# Patient Record
Sex: FEMALE | Race: WHITE
[De-identification: names, ages, dates, MRNs, and addresses within clinical notes are randomized per-mention and may not be internally consistent; named-entity substitution may affect disease eponyms.]

---

## 2020-08-21 NOTE — OR
DATE OF OPERATION:  08/21/2020

 

PREOPERATIVE DIAGNOSIS:  SCREENING COLONOSCOPY.

 

POSTOPERATIVE DIAGNOSIS:  SCREENING COLONOSCOPY.

 

SURGEON:  Kenny Hoskins MD

 

PROCEDURE:  FULL-LENGTH COLONOSCOPY.

 

ANESTHESIA:  MAC.

 

COMPLICATIONS:  None.

 

SPECIMEN:  None.

 

FINDINGS:

1. Full-length colonoscopy.

2. Mild sigmoid diverticulosis.

 

RECOMMENDATIONS:  Followup colonoscopy in 10 years.

 

INDICATIONS:  The patient is due for a routine colon cancer screening and was

sent for such.

 

DESCRIPTION OF PROCEDURE:  The patient was prepped and draped, placed in the

left lateral decubitus position.  A lubricated Olympus colonoscope was inserted

and with relative ease advanced away to the cecum.  We were able to directly

visualize the ileocecal valve and appendiceal orifice.  The bowel prep was fine.

Upon withdrawal of the scope throughout the length of the colon, I could find no

signs of polyps, masses, ulceration, or bleeding sites.  No vascular

abnormalities or signs of colitis.  A few scattered diverticula in the sigmoid

area, but mild.  Rectal vault appeared benign.  Some minimal perianal prep

changes, but no other lesions.  Air was then suctioned, scope removed without

complication.

 

MATT/BRANDON

DD:  08/21/2020 08:30:12

DT:  08/21/2020 11:12:53

Job #:  [id number]

## 2021-02-10 NOTE — EDM.PDOC
ED HPI GENERAL MEDICAL PROBLEM





- General


Chief Complaint: Cardiovascular Problem


Stated Complaint: high blood pressure


Time Seen by Provider: 02/10/21 05:24


Source of Information: Reports: Patient


History Limitations: Reports: No Limitations





- History of Present Illness


INITIAL COMMENTS - FREE TEXT/NARRATIVE: 





Sada is a 65 year old female who presents to ER with complaints of hypertensive 

urgency.  States she awoke around 0230 this am with "my face and neck were 

pounding and could feel every heart beat".  Did check blood pressure and was 

180/122.  Admits that has been dealing with elevated blood pressure as of late. 

Was seen by Antonietta Penelope on Monday with 4 week history of chest cold.  Was started 

on antibiotic and prednisone.  Blood pressure was high in the clinic that day as

well, was told to follow up with Antonietta on this today as may need to adjust dose 

of Bystolic.  Did discuss transitioning to metoprolol due to cost but has not 

yet done that as she had just filled her Rx.  Also relates that she is "nervous 

about transitioning from her usual narcotics to suboxone".  Was planning to see 

Dr. Hoskins for consult on Friday with the hope of starting Suboxone on Monday but 

is scared about the withdrawals while having to be off her narcotics for 2 days 

to start it.  "Not sure if the anxiety is causing the issue with this tonight". 

Does however relate that her blood pressure was higher than normal at her 

physical last fall. 


Onset: Today, Sudden


Duration: Hour(s):, Improving


Location: Reports: Head, Face


Improves with: Reports: None


Associated Symptoms: Reports: Headaches, Malaise.  Denies: Confusion, Chest 

Pain, Loss of Appetite, Nausea/Vomiting, Shortness of Breath


Treatments PTA: Reports: Other Medication(s)


Other Treatments PTA: did take an extra Bystolic 3 hours prior to coming to ER





- Related Data


                                    Allergies











Allergy/AdvReac Type Severity Reaction Status Date / Time


 


amoxicillin [Amoxicillin] Allergy  Rash Verified 02/10/21 05:25


 


cephalexin monohydrate Allergy  Hives Verified 02/10/21 05:25





[From Keflex]     











Home Meds: 


                                    Home Meds





Nebivolol [Bystolic] 10 mg PO DAILY 12/07/14 [History]


SUMAtriptan 100 mg PO BID PRN 12/07/14 [History]


estradioL [Estradiol] 0.5 mg PO DAILY 12/07/14 [History]


traMADol HCl [Ultram] 100 mg PO Q4H PRN 12/07/14 [History]


Aspirin [Halfprin] 81 mg PO DAILY 08/20/20 [History]


Celecoxib 200 mg PO DAILY 08/20/20 [History]


Hydrocodone/Acetaminophen [Hydrocodone-Acetamin  mg] 1 - 2 tab PO Q6H PRN 

08/20/20 [History]


predniSONE 40 mg PO DAILY 02/10/21 [History]











Past Medical History


HEENT History: Reports: Impaired Vision


Cardiovascular History: Reports: Hypertension


Musculoskeletal History: Reports: Arthritis, Back Pain, Chronic


Neurological History: Reports: Migraines





- Past Surgical History


Cardiovascular Surgical History: Reports: None


GI Surgical History: Reports: Cholecystectomy


Musculoskeletal Surgical History: Reports: Hip Replacement





Social & Family History





- Family History


Family Medical History: No Pertinent Family History





- Tobacco Use


Tobacco Use Status *Q: Never Tobacco User


Second Hand Smoke Exposure: No





- Caffeine Use


Caffeine Use: Reports: None





- Recreational Drug Use


Recreational Drug Use: No





ED ROS GENERAL





- Review of Systems


Review Of Systems: See Below


Constitutional: Reports: Malaise.  Denies: Fever, Chills, Weakness, Fatigue, 

Decreased Appetite


HEENT: Reports: Rhinitis, Sinus Problem.  Denies: Ear Pain


Respiratory: Reports: Cough.  Denies: Shortness of Breath


Cardiovascular: Reports: Palpitations (denies in chest but admits to bounding 

pulse in throat and face).  Denies: Chest Pain, Edema, Lightheadedness


Endocrine: Denies: Fatigue


GI/Abdominal: Denies: Abdominal Pain, Nausea, Vomiting


: Reports: No Symptoms


Musculoskeletal: Reports: No Symptoms


Skin: Reports: No Symptoms


Neurological: Reports: Headache


Psychiatric: Reports: Anxiety





ED EXAM, GENERAL





- Physical Exam


Exam: See Below


Exam Limited By: No Limitations


General Appearance: Alert, WD/WN, No Apparent Distress


Ears: Normal External Exam, Normal TMs


Nose: Normal Inspection, Normal Mucosa, No Blood


Throat/Mouth: Normal Inspection, Normal Oropharynx


Head: Normocephalic


Neck: Normal Inspection, Supple, Non-Tender


Respiratory/Chest: No Respiratory Distress, Lungs Clear, Normal Breath Sounds


Cardiovascular: Tachycardia


GI/Abdominal: Normal Bowel Sounds, Soft, Non-Tender


Extremities: Normal Inspection, No Pedal Edema


Neurological: Alert, Oriented


Skin Exam: Warm, Dry





Course





- Vital Signs


Last Recorded V/S: 


                                Last Vital Signs











Temp  98.4 F   02/10/21 05:57


 


Pulse  76   02/10/21 07:29


 


Resp  18   02/10/21 05:57


 


BP  127/60   02/10/21 07:29


 


Pulse Ox  96   02/10/21 05:57














- Orders/Labs/Meds


Orders: 


                               Active Orders 24 hr











 Category Date Time Status


 


 Chest 2V [CR] Stat Exams  02/10/21 05:29 Taken











Labs: 


                                Laboratory Tests











  02/10/21 02/10/21 Range/Units





  05:29 05:29 


 


WBC  9.6   (5.0-10.0)  10^3/uL


 


RBC  4.27   (4.00-5.50)  10^6/uL


 


Hgb  12.9   (12.0-16.0)  g/dL


 


Hct  39.4   (37.0-47.0)  %


 


MCV  92.3   (82.0-94.0)  fL


 


MCH  30.2   (27.0-32.0)  pg


 


MCHC  32.7 L   (33.0-38.0)  g/dL


 


RDW Coeff of Lilia  12.4   (11.0-15.0)  %


 


Plt Count  343   (150-400)  10^3/uL


 


Neut % (Auto)  75.9   (35-85)  %


 


Lymph % (Auto)  18.8   (10-55)  %


 


Mono % (Auto)  5.0   (0-16)  %


 


Eos % (Auto)  0   (0-5)  %


 


Baso % (Auto)  0.3   (0-3)  %


 


Neut # (Auto)  7.25 H   (1.80-7.00)  10^3/uL


 


Lymph # (Auto)  1.80   (1.00-4.80)  10^3/uL


 


Mono # (Auto)  0.48   (0.00-0.80)  10^3/uL


 


Eos # (Auto)  0.00   (0.00-0.45)  10^3/uL


 


Baso # (Auto)  0.03   10^3/uL


 


Sodium   139  (136-145)  mEq/L


 


Potassium   4.2  (3.5-5.0)  mEq/L


 


Chloride   104  ()  mEq/L


 


Carbon Dioxide   29  (21-32)  mmol/L


 


BUN   29 H  (7-18)  mg/dL


 


Creatinine   0.9  (0.6-1.0)  mg/dL


 


Est Cr Clr Drug Dosing   TNP  


 


Estimated GFR (MDRD)   > 60  (>=60)  mL/min


 


Glucose   103 H  (75-99)  mg/dL


 


Calcium   9.0  (8.4-10.1)  mg/dL


 


Lactate Dehydrogenase   212 H  (100-190)  U/L


 


Creatine Kinase   78  ()  U/L


 


Troponin I   < 0.017  (0.00-0.06)  ng/mL











Meds: 


Medications














Discontinued Medications














Generic Name Dose Route Start Last Admin





  Trade Name Domenico  PRN Reason Stop Dose Admin


 


Clonidine HCl  0.1 mg  02/10/21 05:26  02/10/21 05:47





  Catapres  PO  02/10/21 05:27  0.1 mg





  ONETIME ONE   Administration














- Re-Assessments/Exams


Free Text/Narrative Re-Assessment/Exam: 





02/10/21 06:24


EKG shows NSR.  Labs are normal.  clonidine given, blood pressure has improved. 

 Will monitor for the next few hours.  


02/10/21 08:13


Blood pressure has been normal now for the last 2 hours.  Will discharge home, 

increase Bystolic to 20 mg daily and follow up with Antonietta next week





Departure





- Departure


Time of Disposition: 08:14


Disposition: Home, Self-Care 01


Condition: Good


Clinical Impression: 


 Hypertensive urgency





Instructions:  Hypertension, Adult


Referrals: 


Antonietta Negrete PA-C [Primary Care Provider] - 


Forms:  ED Department Discharge


Additional Instructions: 


1.  Rest


2.  Increase Bystolic to 20 mg daily


3.  Follow blood pressure at home, report concerns to Antonietta


4.  Return if any increased chest pain, shortness of breath or concerns.





Sepsis Event Note (ED)





- Evaluation


Sepsis Screening Result: No Definite Risk





- Focused Exam


Vital Signs: 


                                   Vital Signs











  Temp Pulse Resp BP BP Pulse Ox


 


 02/10/21 07:29   76    127/60 


 


 02/10/21 06:46   75    138/71 


 


 02/10/21 05:57  98.4 F  77  18   195/99 H  96


 


 02/10/21 05:47     148/81 H  














- My Orders


Last 24 Hours: 


My Active Orders





02/10/21 05:29


Chest 2V [CR] Stat 














- Assessment/Plan


Last 24 Hours: 


My Active Orders





02/10/21 05:29


Chest 2V [CR] Stat

## 2022-01-07 NOTE — OR
DATE OF OPERATION:  01/07/2022

 

PREOPERATIVE DIAGNOSIS:  IRON-DEFICIENCY ANEMIA.

 

POSTOPERATIVE DIAGNOSIS:  IRON-DEFICIENCY ANEMIA.

 

SURGEON:  Kenny Hoskins MD

 

PROCEDURE:  DIAGNOSTIC ESOPHAGOGASTRODUODENOSCOPY WITH BIOPSIES X4, SAIGE.

 

ANESTHESIA:  MAC.

 

COMPLICATIONS:  None.

 

SPECIMEN:

1. Duodenal bulb biopsy x1.

2. Antral biopsy x2.

3. Antral SAIGE.

4. Fundal polyp.

 

FINDINGS:

1. Full length diagnostic EGD.

2. Moderate duodenitis without active ulceration.

3. Antral gastritis with multiple ulcerations.

4. Fundal polyps, adenomatous.

 

RECOMMENDATIONS:  The patient will be taken off her Celebrex for her

osteoarthritis.  She will be placed on proton pump therapy appropriately and

started on oral iron.

 

INDICATIONS:  The patient presented with fatigue. She had some routine lab which

showed a hemoglobin of 7.6.  She does use NSAIDs regularly and has been having

some abdominal pain relieved with food.

 

DESCRIPTION OF PROCEDURE:  The patient was prepped and draped, placed in the

left lateral decubitus position.  A lubricated Olympus gastroscope was inserted

over a bit, advanced to cricopharyngeus area, and easily intubated into the

esophagus.  The esophageal lining appeared benign in its entire course.  The Z-

line was crisp and sharp at 35 cm.  No hiatal hernia. No distal esophagitis,

stricturing, ulceration, or Joshi changes.  The scope was advanced into the

stomach through the pylorus and into the second portion of the duodenum. This

was benign. The duodenal bulb had diffuse moderate duodenitis without any active

bleeding, erosion, or ulceration.  Biopsy was taken.  The scope was brought back

into the stomach and retroflexed.  The upper fundus and cardia were unremarkable

other than adenomatous polyps throughout the lesser curvature and the fundus,

probably 10 in number. We did remove 1 for confirmation.  Upon straightening,

the rest of the fundus was benign.  The antrum had 2 erosions in the distal

antrum near the pylorus, 1 deeper, and looks like a healed ulceration.  Second

one was more shallow.  We did do biopsy along with a SAIGE. Air was then sucked

from the stomach and the scope removed without complication.

 

MATT/BRANDON

DD:  01/07/2022 07:24:52

DT:  01/07/2022 07:45:14

Job #:  [id number]

## 2023-02-01 ENCOUNTER — HOSPITAL ENCOUNTER (EMERGENCY)
Dept: HOSPITAL 38 - CC.ED | Age: 68
Discharge: HOME | End: 2023-02-01
Payer: MEDICARE

## 2023-02-01 VITALS — HEART RATE: 100 BPM

## 2023-02-01 VITALS — SYSTOLIC BLOOD PRESSURE: 139 MMHG | DIASTOLIC BLOOD PRESSURE: 72 MMHG

## 2023-02-01 DIAGNOSIS — Z88.0: ICD-10-CM

## 2023-02-01 DIAGNOSIS — I10: ICD-10-CM

## 2023-02-01 DIAGNOSIS — R00.0: ICD-10-CM

## 2023-02-01 DIAGNOSIS — Z88.1: ICD-10-CM

## 2023-02-01 DIAGNOSIS — Z79.899: ICD-10-CM

## 2023-02-01 DIAGNOSIS — F41.9: Primary | ICD-10-CM

## 2023-10-04 ENCOUNTER — HOSPITAL ENCOUNTER (EMERGENCY)
Dept: HOSPITAL 38 - CC.ED | Age: 68
Discharge: HOME | End: 2023-10-04
Payer: MEDICARE

## 2023-10-04 DIAGNOSIS — Z88.0: ICD-10-CM

## 2023-10-04 DIAGNOSIS — R60.0: ICD-10-CM

## 2023-10-04 DIAGNOSIS — R11.0: ICD-10-CM

## 2023-10-04 DIAGNOSIS — R42: Primary | ICD-10-CM

## 2023-10-04 DIAGNOSIS — Z79.899: ICD-10-CM

## 2023-10-04 DIAGNOSIS — T45.4X5A: ICD-10-CM

## 2023-10-04 DIAGNOSIS — Z88.1: ICD-10-CM

## 2023-10-04 DIAGNOSIS — D50.9: ICD-10-CM

## 2023-10-04 DIAGNOSIS — I10: ICD-10-CM

## 2023-10-04 LAB
ALBUMIN SERPL-MCNC: 2.7 G/DL (ref 3.4–5)
ALP SERPL-CCNC: 85 U/L (ref 46–116)
ALT SERPL-CCNC: 17 U/L (ref 12–78)
AST SERPL-CCNC: 15 U/L (ref 15–37)
BASOPHILS # BLD AUTO: 0.05 10^3/UL (ref 0–0.5)
BASOPHILS NFR BLD AUTO: 0.6 % (ref 0–1)
BILIRUB SERPL-MCNC: 0.1 MG/DL (ref 0–1)
BNP SERPL-MCNC: 352 PG/ML (ref 0–1000)
BUN SERPL-MCNC: 17 MG/DL (ref 7–18)
CALCIUM SERPL-MCNC: 8.1 MG/DL (ref 8.4–10.1)
CHLORIDE SERPL-SCNC: 103 MEQ/L (ref 98–106)
CO2 SERPL-SCNC: 25 MMOL/L (ref 21–32)
CREAT CL 24H UR+SERPL-VRATE: 44.17 ML/MIN
CREAT SERPL-MCNC: 0.9 MG/DL (ref 0.6–1)
EGFRCR SERPLBLD CKD-EPI 2021: 71 ML/MIN (ref 60–?)
EOSINOPHIL # BLD AUTO: 0.94 10^3/UL (ref 0–1.5)
EOSINOPHIL NFR BLD AUTO: 10.4 % (ref 0–6)
GLUCOSE SERPL-MCNC: 220 MG/DL (ref 75–99)
HCT VFR BLD AUTO: 31.2 % (ref 37–47)
HGB BLD-MCNC: 9.8 G/DL (ref 12–16)
IMM GRANULOCYTES # BLD: 0.01 10^3/UL (ref 0–0.49)
IMM GRANULOCYTES NFR BLD: 0.1 % (ref 0–4.9)
LYMPHOCYTES # BLD AUTO: 1.47 10^3/UL (ref 0.6–5)
LYMPHOCYTES NFR BLD AUTO: 16.2 % (ref 24–44)
MCH RBC QN AUTO: 27.8 PG (ref 27–32)
MCHC RBC AUTO-ENTMCNC: 31.4 G/DL (ref 32–36)
MCHC RBC AUTO-ENTMCNC: 88.4 FL (ref 83–97)
MONOCYTES # BLD AUTO: 0.37 10^3/UL (ref 0–1.5)
MONOCYTES NFR BLD AUTO: 4.1 % (ref 0–10)
NEUTROPHILS # BLD AUTO: 6.23 X10^3/UL (ref 1.8–8)
NEUTROPHILS NFR BLD AUTO: 68.6 % (ref 41–71)
PLATELET # BLD AUTO: 402 10^3/UL (ref 150–400)
POTASSIUM SERPL-SCNC: 4.5 MEQ/L (ref 3.5–5)
PROT SERPL-MCNC: 6.1 G/DL (ref 6.4–8.2)
RBC # BLD AUTO: 3.53 X10^6/UL (ref 4–5.5)
SODIUM SERPL-SCNC: 138 MEQ/L (ref 136–145)
WBC # BLD AUTO: 9.1 10^3/UL (ref 4–11)

## 2025-01-29 ENCOUNTER — HOSPITAL ENCOUNTER (EMERGENCY)
Dept: HOSPITAL 38 - CC.ED | Age: 70
Discharge: HOME | End: 2025-01-29
Payer: MEDICARE

## 2025-01-29 DIAGNOSIS — Z90.49: ICD-10-CM

## 2025-01-29 DIAGNOSIS — Z88.8: ICD-10-CM

## 2025-01-29 DIAGNOSIS — Z90.710: ICD-10-CM

## 2025-01-29 DIAGNOSIS — Z96.649: ICD-10-CM

## 2025-01-29 DIAGNOSIS — N39.0: ICD-10-CM

## 2025-01-29 DIAGNOSIS — Z96.659: ICD-10-CM

## 2025-01-29 DIAGNOSIS — Z88.0: ICD-10-CM

## 2025-01-29 DIAGNOSIS — I10: ICD-10-CM

## 2025-01-29 DIAGNOSIS — J10.1: Primary | ICD-10-CM

## 2025-01-29 DIAGNOSIS — Z79.899: ICD-10-CM

## 2025-01-29 LAB
ALBUMIN SERPL-MCNC: 3.2 G/DL (ref 3.4–5)
ALP SERPL-CCNC: 116 U/L (ref 46–116)
ALT SERPL-CCNC: 42 U/L (ref 12–78)
APPEARANCE UR: (no result)
AST SERPL-CCNC: 38 U/L (ref 15–37)
BACTERIA URNS QL MICRO: (no result) /HPF
BASOPHILS # BLD AUTO: 0.02 10^3/UL (ref 0–0.5)
BASOPHILS NFR BLD AUTO: 0.4 % (ref 0–1)
BILIRUB SERPL-MCNC: 0.5 MG/DL (ref 0–1)
BILIRUB UR STRIP-MCNC: NEGATIVE MG/DL
BUN SERPL-MCNC: 10 MG/DL (ref 7–18)
CALCIUM SERPL-MCNC: 8.4 MG/DL (ref 8.4–10.1)
CHLORIDE SERPL-SCNC: 100 MEQ/L (ref 98–106)
CO2 SERPL-SCNC: 23 MMOL/L (ref 21–32)
COLOR UR: YELLOW
CREAT CL 24H UR+SERPL-VRATE: 49.01 ML/MIN
CREAT SERPL-MCNC: 0.8 MG/DL (ref 0.6–1)
CRP SERPL-MCNC: 3.79 MG/DL (ref ?–0.5)
EGFRCR SERPLBLD CKD-EPI 2021: 81 ML/MIN (ref 60–?)
EOSINOPHIL # BLD AUTO: 0.05 10^3/UL (ref 0–1.5)
EOSINOPHIL NFR BLD AUTO: 0.9 % (ref 0–6)
EPI CELLS #/AREA URNS HPF: (no result) /HPF
FLUAV RNA UPPER RESP QL NAA+PROBE: POSITIVE
FLUBV RNA UPPER RESP QL NAA+PROBE: NEGATIVE
GLUCOSE SERPL-MCNC: 125 MG/DL (ref 75–99)
GLUCOSE UR STRIP-MCNC: NEGATIVE MG/DL
HCT VFR BLD AUTO: 35.8 % (ref 37–47)
HGB BLD-MCNC: 11.7 G/DL (ref 12–16)
IMM GRANULOCYTES # BLD: 0.01 10^3/UL (ref 0–0.49)
IMM GRANULOCYTES NFR BLD: 0.2 % (ref 0–4.9)
KETONES UR STRIP-MCNC: (no result) MG/DL
LYMPHOCYTES # BLD AUTO: 0.28 10^3/UL (ref 0.6–5)
LYMPHOCYTES NFR BLD AUTO: 5.2 % (ref 24–44)
MCH RBC QN AUTO: 29.9 PG (ref 27–32)
MCHC RBC AUTO-ENTMCNC: 32.7 G/DL (ref 32–36)
MCHC RBC AUTO-ENTMCNC: 91.6 FL (ref 83–97)
MONOCYTES # BLD AUTO: 0.51 10^3/UL (ref 0–1.5)
MONOCYTES NFR BLD AUTO: 9.5 % (ref 0–10)
NEUTROPHILS # BLD AUTO: 4.49 X10^3/UL (ref 1.8–8)
NEUTROPHILS NFR BLD AUTO: 83.8 % (ref 41–71)
NITRITE UR QL: NEGATIVE
PH UR STRIP: 6 [PH] (ref 4.5–8)
PLATELET # BLD AUTO: 198 10^3/UL (ref 150–400)
POTASSIUM SERPL-SCNC: 3.8 MEQ/L (ref 3.5–5)
PROT SERPL-MCNC: 7.2 G/DL (ref 6.4–8.2)
PROT UR STRIP-MCNC: 30 MG/DL
RBC # BLD AUTO: 3.91 X10^6/UL (ref 4–5.5)
RBC # URNS HPF: (no result) /HPF (ref 0–5)
RBC UR QL: (no result)
SARS-COV-2 RNA RESP QL NAA+PROBE: NEGATIVE
SODIUM SERPL-SCNC: 134 MEQ/L (ref 136–145)
SP GR UR STRIP: 1.02 (ref 1–1.02)
UROBILINOGEN UR STRIP-ACNC: 0.2 EU/DL (ref 0.2–1)
WBC # BLD AUTO: 5.4 10^3/UL (ref 4–11)
WBC UR QL: (no result) /HPF (ref 0–5)

## 2025-01-29 RX ADMIN — CEFTRIAXONE SODIUM SCH INJ: 1 INJECTION, POWDER, FOR SOLUTION INTRAMUSCULAR; INTRAVENOUS at 12:24
